# Patient Record
Sex: FEMALE | Race: WHITE | NOT HISPANIC OR LATINO | Employment: UNEMPLOYED | URBAN - NONMETROPOLITAN AREA
[De-identification: names, ages, dates, MRNs, and addresses within clinical notes are randomized per-mention and may not be internally consistent; named-entity substitution may affect disease eponyms.]

---

## 2017-01-24 ENCOUNTER — OFFICE VISIT (OUTPATIENT)
Dept: ORTHOPEDIC SURGERY | Facility: CLINIC | Age: 67
End: 2017-01-24

## 2017-01-24 DIAGNOSIS — M72.0 DUPUYTREN'S CONTRACTURE OF LEFT HAND: Primary | ICD-10-CM

## 2017-01-24 PROCEDURE — 99203 OFFICE O/P NEW LOW 30 MIN: CPT | Performed by: ORTHOPAEDIC SURGERY

## 2017-01-24 RX ORDER — TOPIRAMATE 25 MG/1
TABLET ORAL
COMMUNITY
Start: 2016-11-17 | End: 2018-02-22

## 2017-01-24 RX ORDER — BORON CITRATE 3 MG
TABLET ORAL
COMMUNITY
End: 2018-02-22

## 2017-01-24 RX ORDER — ATORVASTATIN CALCIUM 20 MG/1
TABLET, FILM COATED ORAL
COMMUNITY
Start: 2017-01-17

## 2017-01-24 RX ORDER — LISINOPRIL 5 MG/1
TABLET ORAL
COMMUNITY
Start: 2016-12-28

## 2017-01-24 RX ORDER — CHROMIUM 200 MCG
TABLET ORAL
COMMUNITY

## 2017-01-24 RX ORDER — LEVOTHYROXINE SODIUM 0.1 MG/1
TABLET ORAL
COMMUNITY
Start: 2016-12-28

## 2017-01-24 RX ORDER — ACETAMINOPHEN/DIPHENHYDRAMINE 500MG-25MG
TABLET ORAL
COMMUNITY

## 2017-01-24 RX ORDER — VENLAFAXINE 75 MG/1
TABLET ORAL
COMMUNITY
Start: 2017-01-19

## 2017-01-24 RX ORDER — CETIRIZINE HYDROCHLORIDE 10 MG/1
10 TABLET ORAL DAILY
COMMUNITY

## 2017-01-24 RX ORDER — VITAMIN E 268 MG
400 CAPSULE ORAL DAILY
COMMUNITY
End: 2018-02-22

## 2017-01-24 RX ORDER — ASPIRIN 81 MG/1
81 TABLET ORAL DAILY
COMMUNITY

## 2017-01-24 RX ORDER — FLUOXETINE 10 MG/1
CAPSULE ORAL
COMMUNITY
Start: 2016-12-28

## 2017-01-24 RX ORDER — ASCORBIC ACID 500 MG
500 TABLET ORAL DAILY
COMMUNITY

## 2017-01-24 NOTE — MR AVS SNAPSHOT
Sabrina Ohara   2017 1:15 PM   Office Visit    Dept Phone:  [phone]   Encounter #:  [id number]    Provider:  Kwabena Perdomo MD   Department:  [facility]                Your Full Care Plan              Your Updated Medication List          This list is accurate as of: 17  1:47 PM.  Always use your most recent med list.                aspirin 81 MG EC tablet       atorvastatin 20 MG tablet   Commonly known as:  LIPITOR       Olla 3 MG capsule       Calcium-Magnesium 250-125 MG tablet       cetirizine 10 MG tablet   Commonly known as:  zyrTEC       CRANBERRY PO       FLUoxetine 10 MG capsule   Commonly known as:  PROzac       Glucosamine-Chondroitin-Vit C 0889-2796-43 MG/30ML liquid       L-Lysine 500 MG capsule       levothyroxine 100 MCG tablet   Commonly known as:  SYNTHROID, LEVOTHROID       lisinopril 5 MG tablet   Commonly known as:  PRINIVIL,ZESTRIL       LUTEIN 20 PO       topiramate 25 MG tablet   Commonly known as:  TOPAMAX       venlafaxine 75 MG tablet   Commonly known as:  EFFEXOR       vitamin A 8000 UNIT capsule       vitamin C 500 MG tablet   Commonly known as:  ASCORBIC ACID       vitamin E 400 UNIT capsule               Instructions     None    Patient Instructions History      Upcoming Appointments     Visit Type Date Time Department    NEW PATIENT 2017  1:15 PM [facility]     FOLLOW UP 2017  1:45 PM [facility]       [employer] Signup     [facility] [employer] allows you to send messages to your doctor, view your test results, renew your prescriptions, schedule appointments, and more. To sign up, go to [facility] and click on the Sign Up Now link in the New User? box. Enter your [employer] Activation Code exactly as it appears below along with the last four digits of your Social Security Number and your Date of Birth () to complete the sign-up process. If you do not sign up  before the expiration date, you must request a new code.    [employer] Activation Code: [email or web address]  Expires: 2/7/2017  1:47 PM    If you have questions, you can email [email or web address] or call [phone] to talk to our [employer] staff. Remember, [employer] is NOT to be used for urgent needs. For medical emergencies, dial 911.               Other Info from Your Visit           Your Appointments     Jul 25, 2017  1:45 PM EDT   Follow Up with Kwabena Perdomo MD   [facility] (--)    [address] [place] [address]   [phone]           Arrive 15 minutes prior to appointment.              Allergies     Amoxicillin      Mold Extract [Trichophyton]      Penicillins      Sulfa Antibiotics        Reason for Visit     Left Hand - Establish Care           Vital Signs     Smoking Status                   Never Smoker

## 2017-01-24 NOTE — LETTER
January 30, 2017     Jessica Branham MD  [address]  [facility] [address]    Patient: Sabrina Ohara   YOB: 1950   Date of Visit: 1/24/2017       Dear Dr. Yonas MD:    Thank you for referring Sabrina Ohara to me for evaluation. Below are the relevant portions of my assessment and plan of care.    If you have questions, please do not hesitate to call me. I look forward to following Sabrina along with you.         Sincerely,        Kwabena Perdomo MD        CC: No Recipients  Kwabena Perdomo MD  1/30/2017  7:20 PM  Signed  Chief Complaint   Patient presents with   • Left Hand - Establish Care             HPI the patient is here today as a new patient complaining of left hand pain. It has been on going for 1.5 years.  Patient states that she is developing a flexion contracture of the fourth digits of the left hand.  This is somewhat painful especially when she tries full extension.  It is somewhat hampering her day-to-day function.  She finds some pain with  strength and feels like she is lacking some  strength and full flexion.    Patient complains of tightening of the tendons of the fourth and fifth digits.  She states that the pain is intermittent but when it occurs it is quite severe and is associated with a burning sensation.  She denies history of trauma.  She is not a alcohol abuser.  She does not have similar changes on the opposite hand all the sole of the foot.  She does not have any risk factors for epilepsy or any of the other factors that can predispose the patient to Dupuytren's disease.  She is not quite ready for a surgical release to chair because she feels like her symptoms are very tolerable and her day-to-day function is still fairly well maintained without any significant impediments          Allergies   Allergen Reactions   • Amoxicillin    • Mold Extract [Trichophyton]    • Penicillins    • Sulfa Antibiotics          Social History     Social History   • Marital  status:      Spouse name: N/A   • Number of children: N/A   • Years of education: N/A     Occupational History   • Not on file.     Social History Main Topics   • Smoking status: Never Smoker   • Smokeless tobacco: Not on file   • Alcohol use No   • Drug use: No   • Sexual activity: Defer     Other Topics Concern   • Not on file     Social History Narrative   • No narrative on file       Family History   Problem Relation Age of Onset   • Diabetes Other    • Heart disease Other    • Lung disease Other        Past Surgical History   Procedure Laterality Date   • Wrist surgery         Past Medical History   Diagnosis Date   • Anxiety and depression    • Osteoporosis    • Stroke    • Thyroid disease            There were no vitals filed for this visit.          Review of Systems   Constitutional: Negative for activity change, appetite change, fatigue, fever and unexpected weight change.   HENT: Negative for congestion, sneezing and voice change.    Eyes: Negative for visual disturbance.   Respiratory: Negative for cough, chest tightness and wheezing.    Cardiovascular: Negative for chest pain, palpitations and leg swelling.   Gastrointestinal: Negative for abdominal distention, constipation, diarrhea and vomiting.   Endocrine: Negative for polydipsia, polyphagia and polyuria.   Genitourinary: Negative for decreased urine volume, difficulty urinating, dysuria, flank pain and frequency.   Musculoskeletal: Positive for joint swelling and myalgias. Negative for arthralgias, back pain, gait problem, neck pain and neck stiffness.   Skin: Negative for color change, pallor and wound.   Allergic/Immunologic: Negative for environmental allergies and food allergies.   Neurological: Positive for weakness and numbness. Negative for dizziness and headaches.   Hematological: Does not bruise/bleed easily.   Psychiatric/Behavioral: Positive for sleep disturbance. Negative for agitation, confusion and decreased concentration. The  patient is not nervous/anxious.            Physical Exam   Constitutional: She is oriented to person, place, and time. She appears well-developed and well-nourished.   HENT:   Head: Normocephalic.   Eyes: Conjunctivae are normal. Pupils are equal, round, and reactive to light.   Neck: Normal range of motion. Neck supple. No JVD present.   Cardiovascular: Normal rate, normal heart sounds and intact distal pulses.    Pulmonary/Chest: Effort normal and breath sounds normal. No respiratory distress.   Abdominal: Soft. Bowel sounds are normal. There is no tenderness. There is no guarding.   Musculoskeletal: She exhibits edema and tenderness.   Lymphadenopathy:     She has no cervical adenopathy.   Neurological: She is alert and oriented to person, place, and time. She has normal reflexes.   Skin: Skin is warm and dry.   Psychiatric: Her behavior is normal. Judgment and thought content normal.   Vitals reviewed.              Joint/Body Part Specific Exam:  left  hand. The affected ray has thickened longitudinal bands. The mcp joint shows a flexion contracture. DIP joint is also somewhat affected. The patient is able to make a fist although slowly and painfully so. The hand cannot be rested flat on the table. There is no rotatory malalignment. Flexor tendon function is well preserved. Extensor tendon function is compromised because of the flexion contracture from the Dupuytren’s bands. On the volar side of the hand there is thickening and puckering of the skin in the vicinity of the  4TH MCP and PIP joints. Capillary refill is 2 seconds with a brisk return. Median and ulnar nerve functions are well preserved. Skin and soft tissue swelling is consistent with a long-standing contracture.          X-RAY Report:            Diagnostics:            Sabrina was seen today for establish care.    Diagnoses and all orders for this visit:    Dupuytren's contracture of left hand            Procedures          I provided this patient  with educational materials regarding cast or splint care.        Plan:   Extensive discussion about the pathology of a contracture with the patient.    I have discussed the Xiaflex injection with this patient to break down the contracture followed by bracing.    Home-based exercise program with stretching and filling exercises.    Vitamin E for local application and FEELS to minimize the possibility of further contracture development.    Home-based exercise program including stretching and strengthening of the flexor tendons.    Careful periodic observation and follow-up to see if this contracture progress is.    If it does progress and causes the MCP joint to have a flexion contracture of 30° or more than I certainly feel this should be a surgical consideration with a release of the contracture she understands and accepts.    Follow-up in 6 months.            CC To Jessica Branham MD

## 2017-01-30 PROBLEM — M72.0 DUPUYTREN'S CONTRACTURE OF LEFT HAND: Status: ACTIVE | Noted: 2017-01-30

## 2018-02-22 ENCOUNTER — OFFICE VISIT (OUTPATIENT)
Dept: ORTHOPEDIC SURGERY | Facility: CLINIC | Age: 68
End: 2018-02-22

## 2018-02-22 VITALS — WEIGHT: 120 LBS | HEIGHT: 64 IN | BODY MASS INDEX: 20.49 KG/M2

## 2018-02-22 DIAGNOSIS — M72.0 DUPUYTREN'S CONTRACTURE OF LEFT HAND: Primary | ICD-10-CM

## 2018-02-22 DIAGNOSIS — M77.8 TENDINITIS OF THUMB: ICD-10-CM

## 2018-02-22 PROCEDURE — 99213 OFFICE O/P EST LOW 20 MIN: CPT | Performed by: ORTHOPAEDIC SURGERY

## 2018-02-22 NOTE — PROGRESS NOTES
Chief Complaint   Patient presents with   • Left Wrist - Pain   • Left Hand - Pain   This patient is here today with left wrist/hand pain.         HPI patient is complaining of pain and discomfort on the radial side of the left wrist.  She states that her symptoms began in November 2017 when she was caring heavy shopping bag over her left shoulder along with her purse.  Those 2 objects slid off her shoulder and landed directly on her left wrist.  She had a significant impact over the radial styloid process.  Since that time she finds it very difficult to radially deviate the wrist.  She also has a difficult time making a fist.  Radial deviation of the wrist bothers the patient significantly.  She has also noticed that on the same hand she is starting to develop a flexion contracture of the third ray.  This appears to be related to a Dupuytren's contracture.  SHe describes her pain as intermittently present but stabbing in nature.  She also  complains of swelling along the radial side of the wrist.  She has been seen by a chiropractor in [place] and wanted to double check her diagnoses.  We have gone over the pathology of the first dorsal compartment tendinitis as well as the pathology of Dupuytren's contracture which might eventually need either a Xiaflex injection or a surgical release.          Allergies   Allergen Reactions   • Amoxicillin    • Mold Extract [Trichophyton]    • Penicillins    • Sulfa Antibiotics          Social History     Social History   • Marital status:      Spouse name: N/A   • Number of children: N/A   • Years of education: N/A     Occupational History   • Not on file.     Social History Main Topics   • Smoking status: Never Smoker   • Smokeless tobacco: Not on file   • Alcohol use No   • Drug use: No   • Sexual activity: Defer     Other Topics Concern   • Not on file     Social History Narrative   • No narrative on file       Family History   Problem Relation Age of Onset   •  Diabetes Other    • Heart disease Other    • Lung disease Other        Past Surgical History:   Procedure Laterality Date   • WRIST SURGERY         Past Medical History:   Diagnosis Date   • Anxiety and depression    • Osteoporosis    • Stroke    • Thyroid disease            There were no vitals filed for this visit.          Review of Systems   Constitutional: Negative.    HENT: Negative.    Eyes: Negative.    Respiratory: Negative.    Cardiovascular: Negative.    Gastrointestinal: Negative.    Endocrine: Negative.    Genitourinary: Negative.    Musculoskeletal: Positive for joint swelling.   Skin: Negative.    Allergic/Immunologic: Negative.    Neurological: Negative.    Hematological: Negative.    Psychiatric/Behavioral: Negative.            Physical Exam   Constitutional: She is oriented to person, place, and time. She appears well-nourished.   HENT:   Head: Atraumatic.   Eyes: EOM are normal.   Neck: Neck supple.   Cardiovascular: Regular rhythm, normal heart sounds and intact distal pulses.    Pulmonary/Chest: Effort normal and breath sounds normal.   Abdominal: Bowel sounds are normal.   Musculoskeletal: She exhibits edema and tenderness.   Neurological: She is alert and oriented to person, place, and time. She has normal reflexes.   Skin: Skin is dry.   Psychiatric: She has a normal mood and affect. Her behavior is normal. Judgment and thought content normal.   Nursing note and vitals reviewed.              Joint/Body Part Specific Exam:  left wrist. The patient is right  hand dominant. There is a significant amount of swelling and tenderness along the 1st dorsal compartment tendons. Abduction of the thumb bothers the patient significantly. There is pain and tenderness over the radial styloid process. Skin and soft tissues are somewhat swollen and mildly bruised. Finkelstein sign is positive. Ulnar deviation of the wrist bothers the patient significantly. Patients  strength is somewhat compromised  because of the pain along the base of the thumb and over the radial styloid process. Patient is unable to make a fist with good  strength when fist is clenched. Capillary refill is 2 seconds with a brisk return. Radial artery pulses are palpable. Median nerve function is well preserved. There is some amount of inflammation over the dorsal extensor tendon sheaths over the remaining radial carpal compartments dorsally.      Left third metacarpal ray: The patient has a flexion contracture involving the long each her cord of the third ray.  There is a tender nodule present over the wall or aspect of the base of the MCP joint.  The contracture does not limit range of motion of the MCP joint at this point.  The patient is able to lay her hand flat on the surface of the table without any flexion contracture of the MCP joint.  She is able to make a fist without any difficulty.  There is no localized tenderness over this contracted nodule.    X-RAY Report:            Diagnostics:            Sabrina was seen today for pain and pain.    Diagnoses and all orders for this visit:    Dupuytren's contracture of left hand    Tendinitis of thumb          Procedures          I provided this patient with educational materials regarding bone health.        Plan:   Avoid repetitive lifting and loading with the thumb and the third digit.    Topical application of Pennsaid to this region of inflamed tendon on the radial side of the thumb.    Patient does not want a steroid injection to this first dorsal compartment tendon sheath all though I have offered that to her.    The Dupuytren's contracture is not limiting her MCP joint function at this point and therefore no surgical treatment or Xiaflex injection treatments are indicated at this point.    We will follow the contracture carefully and see if it evolves and becomes worse, then she might be a candidate for either injection treatment or surgical resection.    Follow-up in my office  in 3 months for reevaluation.        CC To Jessica Branham MD

## 2018-02-24 PROBLEM — M77.8 TENDINITIS OF THUMB: Status: ACTIVE | Noted: 2018-02-24

## 2018-02-26 ENCOUNTER — TELEPHONE (OUTPATIENT)
Dept: ORTHOPEDIC SURGERY | Facility: CLINIC | Age: 68
End: 2018-02-26

## 2018-02-26 NOTE — TELEPHONE ENCOUNTER
Patient called stating that she has not received a phone call from the external pharmacy regarding Pennsaid. I have sent in the medication for her. I advised her if they do not cover it there is another prescription we can send in to a pharmacy in [place], [place]. She stated she did have a few bumps come up on her arm. I did inform her that with her Sulfa allergy this is common. I informed the patient if it continues then to discontinue the Pennsaid

## 2018-03-01 ENCOUNTER — TELEPHONE (OUTPATIENT)
Dept: ORTHOPEDIC SURGERY | Facility: CLINIC | Age: 68
End: 2018-03-01

## 2018-03-01 NOTE — TELEPHONE ENCOUNTER
PATIENT CALLED AND STATED THAT PENNSAID ISN'T COVERED BY HER INSURANCE ([employer]).  SHE IS WANTING TO KNOW IF DR. SLOAN CAN PRESCRIBE?

## 2018-03-01 NOTE — TELEPHONE ENCOUNTER
I have sent Voltaren Gel to the patients local pharmacy. Patient has been notified via voicemail.

## 2018-03-08 ENCOUNTER — TELEPHONE (OUTPATIENT)
Dept: ORTHOPEDIC SURGERY | Facility: CLINIC | Age: 68
End: 2018-03-08

## 2018-03-08 NOTE — TELEPHONE ENCOUNTER
Patient states she can not use Voltaren Gel, she did not realize it was an NSAID. She states her cardiologist does not want her using any NSAIDs on a regular basis. She states she will try to find something more natural to ease her wrist pain. She will let us know if and when she finds something.

## 2018-04-24 ENCOUNTER — OFFICE VISIT (OUTPATIENT)
Dept: ORTHOPEDIC SURGERY | Facility: CLINIC | Age: 68
End: 2018-04-24

## 2018-04-24 VITALS — BODY MASS INDEX: 20.14 KG/M2 | WEIGHT: 118 LBS | HEIGHT: 64 IN

## 2018-04-24 DIAGNOSIS — M77.8 TENDINITIS OF THUMB: ICD-10-CM

## 2018-04-24 DIAGNOSIS — M77.8 LEFT WRIST TENDINITIS: Primary | ICD-10-CM

## 2018-04-24 PROCEDURE — 99213 OFFICE O/P EST LOW 20 MIN: CPT | Performed by: ORTHOPAEDIC SURGERY

## 2018-05-08 PROBLEM — M77.8 LEFT WRIST TENDINITIS: Status: ACTIVE | Noted: 2018-05-08

## 2025-03-25 NOTE — PROGRESS NOTES
Chief Complaint   Patient presents with   • Left Hand - Establish Care             HPI the patient is here today as a new patient complaining of left hand pain. It has been on going for 1.5 years.  Patient states that she is developing a flexion contracture of the fourth digits of the left hand.  This is somewhat painful especially when she tries full extension.  It is somewhat hampering her day-to-day function.  She finds some pain with  strength and feels like she is lacking some  strength and full flexion.    Patient complains of tightening of the tendons of the fourth and fifth digits.  She states that the pain is intermittent but when it occurs it is quite severe and is associated with a burning sensation.  She denies history of trauma.  She is not a alcohol abuser.  She does not have similar changes on the opposite hand all the sole of the foot.  She does not have any risk factors for epilepsy or any of the other factors that can predispose the patient to Dupuytren's disease.  She is not quite ready for a surgical release to chair because she feels like her symptoms are very tolerable and her day-to-day function is still fairly well maintained without any significant impediments          Allergies   Allergen Reactions   • Amoxicillin    • Mold Extract [Trichophyton]    • Penicillins    • Sulfa Antibiotics          Social History     Social History   • Marital status:      Spouse name: N/A   • Number of children: N/A   • Years of education: N/A     Occupational History   • Not on file.     Social History Main Topics   • Smoking status: Never Smoker   • Smokeless tobacco: Not on file   • Alcohol use No   • Drug use: No   • Sexual activity: Defer     Other Topics Concern   • Not on file     Social History Narrative   • No narrative on file       Family History   Problem Relation Age of Onset   • Diabetes Other    • Heart disease Other    • Lung disease Other        Past Surgical History   Procedure  Sent pt a My Chat message with the Self Scheduling Ticket attached.   Laterality Date   • Wrist surgery         Past Medical History   Diagnosis Date   • Anxiety and depression    • Osteoporosis    • Stroke    • Thyroid disease            There were no vitals filed for this visit.          Review of Systems   Constitutional: Negative for activity change, appetite change, fatigue, fever and unexpected weight change.   HENT: Negative for congestion, sneezing and voice change.    Eyes: Negative for visual disturbance.   Respiratory: Negative for cough, chest tightness and wheezing.    Cardiovascular: Negative for chest pain, palpitations and leg swelling.   Gastrointestinal: Negative for abdominal distention, constipation, diarrhea and vomiting.   Endocrine: Negative for polydipsia, polyphagia and polyuria.   Genitourinary: Negative for decreased urine volume, difficulty urinating, dysuria, flank pain and frequency.   Musculoskeletal: Positive for joint swelling and myalgias. Negative for arthralgias, back pain, gait problem, neck pain and neck stiffness.   Skin: Negative for color change, pallor and wound.   Allergic/Immunologic: Negative for environmental allergies and food allergies.   Neurological: Positive for weakness and numbness. Negative for dizziness and headaches.   Hematological: Does not bruise/bleed easily.   Psychiatric/Behavioral: Positive for sleep disturbance. Negative for agitation, confusion and decreased concentration. The patient is not nervous/anxious.            Physical Exam   Constitutional: She is oriented to person, place, and time. She appears well-developed and well-nourished.   HENT:   Head: Normocephalic.   Eyes: Conjunctivae are normal. Pupils are equal, round, and reactive to light.   Neck: Normal range of motion. Neck supple. No JVD present.   Cardiovascular: Normal rate, normal heart sounds and intact distal pulses.    Pulmonary/Chest: Effort normal and breath sounds normal. No respiratory distress.   Abdominal: Soft. Bowel sounds are normal. There is  no tenderness. There is no guarding.   Musculoskeletal: She exhibits edema and tenderness.   Lymphadenopathy:     She has no cervical adenopathy.   Neurological: She is alert and oriented to person, place, and time. She has normal reflexes.   Skin: Skin is warm and dry.   Psychiatric: Her behavior is normal. Judgment and thought content normal.   Vitals reviewed.              Joint/Body Part Specific Exam:  left  hand. The affected ray has thickened longitudinal bands. The mcp joint shows a flexion contracture. DIP joint is also somewhat affected. The patient is able to make a fist although slowly and painfully so. The hand cannot be rested flat on the table. There is no rotatory malalignment. Flexor tendon function is well preserved. Extensor tendon function is compromised because of the flexion contracture from the Dupuytren’s bands. On the volar side of the hand there is thickening and puckering of the skin in the vicinity of the  4TH MCP and PIP joints. Capillary refill is 2 seconds with a brisk return. Median and ulnar nerve functions are well preserved. Skin and soft tissue swelling is consistent with a long-standing contracture.          X-RAY Report:            Diagnostics:            Sabrina was seen today for establish care.    Diagnoses and all orders for this visit:    Dupuytren's contracture of left hand            Procedures          I provided this patient with educational materials regarding cast or splint care.        Plan:   Extensive discussion about the pathology of a contracture with the patient.    I have discussed the Xiaflex injection with this patient to break down the contracture followed by bracing.    Home-based exercise program with stretching and filling exercises.    Vitamin E for local application and FEELS to minimize the possibility of further contracture development.    Home-based exercise program including stretching and strengthening of the flexor tendons.    Careful periodic  observation and follow-up to see if this contracture progress is.    If it does progress and causes the MCP joint to have a flexion contracture of 30° or more than I certainly feel this should be a surgical consideration with a release of the contracture she understands and accepts.    Follow-up in 6 months.            CC To Jessica Branham MD